# Patient Record
Sex: FEMALE | Race: WHITE | HISPANIC OR LATINO | URBAN - METROPOLITAN AREA
[De-identification: names, ages, dates, MRNs, and addresses within clinical notes are randomized per-mention and may not be internally consistent; named-entity substitution may affect disease eponyms.]

---

## 2024-08-13 ENCOUNTER — TELEPHONE ENCOUNTER (OUTPATIENT)
Dept: URBAN - METROPOLITAN AREA CLINIC 54 | Facility: CLINIC | Age: 39
End: 2024-08-13

## 2024-08-16 ENCOUNTER — DASHBOARD ENCOUNTERS (OUTPATIENT)
Age: 39
End: 2024-08-16

## 2024-08-16 ENCOUNTER — OFFICE VISIT (OUTPATIENT)
Dept: URBAN - METROPOLITAN AREA CLINIC 54 | Facility: CLINIC | Age: 39
End: 2024-08-16

## 2024-08-16 ENCOUNTER — LAB OUTSIDE AN ENCOUNTER (OUTPATIENT)
Dept: URBAN - METROPOLITAN AREA CLINIC 54 | Facility: CLINIC | Age: 39
End: 2024-08-16

## 2024-08-16 VITALS
HEIGHT: 62 IN | TEMPERATURE: 97.3 F | DIASTOLIC BLOOD PRESSURE: 74 MMHG | WEIGHT: 269.1 LBS | SYSTOLIC BLOOD PRESSURE: 104 MMHG | HEART RATE: 96 BPM | BODY MASS INDEX: 49.52 KG/M2

## 2024-08-16 PROBLEM — 235595009: Status: ACTIVE | Noted: 2024-08-16

## 2024-08-16 PROBLEM — 40739000: Status: ACTIVE | Noted: 2024-08-16

## 2024-08-16 RX ORDER — SUCRALFATE 1 G/1
1 TABLET ON AN EMPTY STOMACH TABLET ORAL
Qty: 120 TABLET | Refills: 0
End: 2024-09-15

## 2024-08-16 RX ORDER — PANTOPRAZOLE SODIUM 40 MG/1
1 TABLET TABLET, DELAYED RELEASE ORAL ONCE A DAY
Status: ACTIVE | COMMUNITY

## 2024-08-16 RX ORDER — PANTOPRAZOLE SODIUM 40 MG/1
1 TABLET TABLET, DELAYED RELEASE ORAL ONCE A DAY
Qty: 90 TABLET | Refills: 1

## 2024-08-16 RX ORDER — HYOSCYAMINE SULFATE 0.12 MG/1
1 TABLET AS NEEDED TABLET ORAL THREE TIMES A DAY
Qty: 90 TABLET | Refills: 3 | OUTPATIENT
Start: 2024-08-16 | End: 2024-12-13

## 2024-08-16 RX ORDER — SUCRALFATE 1 G/1
1 TABLET ON AN EMPTY STOMACH TABLET ORAL TWICE A DAY
Status: ACTIVE | COMMUNITY

## 2024-08-16 NOTE — HPI-TODAY'S VISIT:
Ann-Marie Mando is a  38 year year old female pt with PMH s/p CCY who presents for RUQ pain with radiation. NSAID regularlly and discontinued

## 2024-08-22 ENCOUNTER — TELEPHONE ENCOUNTER (OUTPATIENT)
Dept: URBAN - METROPOLITAN AREA CLINIC 54 | Facility: CLINIC | Age: 39
End: 2024-08-22

## 2024-09-10 ENCOUNTER — TELEPHONE ENCOUNTER (OUTPATIENT)
Dept: URBAN - METROPOLITAN AREA CLINIC 54 | Facility: CLINIC | Age: 39
End: 2024-09-10

## 2024-10-18 ENCOUNTER — OFFICE VISIT (OUTPATIENT)
Dept: URBAN - METROPOLITAN AREA CLINIC 54 | Facility: CLINIC | Age: 39
End: 2024-10-18

## 2024-10-23 ENCOUNTER — OFFICE VISIT (OUTPATIENT)
Dept: URBAN - METROPOLITAN AREA CLINIC 54 | Facility: CLINIC | Age: 39
End: 2024-10-23

## 2024-10-23 RX ORDER — OMEPRAZOLE 20 MG/1
1 CAPSULE 1/2 TO 1 HOUR BEFORE MORNING MEAL CAPSULE, DELAYED RELEASE ORAL ONCE A DAY
Status: ACTIVE | COMMUNITY

## 2024-10-23 RX ORDER — DICYCLOMINE HYDROCHLORIDE 20 MG/1
1 TABLET TABLET ORAL THREE TIMES A DAY
Status: ACTIVE | COMMUNITY

## 2024-10-23 RX ORDER — ONDANSETRON HYDROCHLORIDE 4 MG/1
1 TABLET TABLET, FILM COATED ORAL ONCE A DAY
Status: ACTIVE | COMMUNITY

## 2024-10-23 RX ORDER — SUCRALFATE 1 G/1
1 TABLET ON AN EMPTY STOMACH TABLET ORAL TWICE A DAY
Status: ACTIVE | COMMUNITY

## 2024-10-23 RX ORDER — PANTOPRAZOLE SODIUM 40 MG/1
1 TABLET TABLET, DELAYED RELEASE ORAL ONCE A DAY
Qty: 90 TABLET | Refills: 1 | Status: ACTIVE | COMMUNITY

## 2024-10-23 RX ORDER — METHOCARBAMOL 500 MG/1
1.5 TABLETS TABLET ORAL
Status: ACTIVE | COMMUNITY

## 2024-10-23 RX ORDER — RIFABUTIN 150 MG/1
2 CAPSULE WITH FOOD CAPSULE ORAL ONCE A DAY
Qty: 28 CAPSULE | Refills: 0 | Status: ACTIVE | COMMUNITY
Start: 2024-09-03

## 2024-10-23 RX ORDER — HYOSCYAMINE SULFATE 0.12 MG/1
1 TABLET AS NEEDED TABLET ORAL THREE TIMES A DAY
Qty: 90 TABLET | Refills: 3 | Status: ACTIVE | COMMUNITY
Start: 2024-08-16 | End: 2024-12-13

## 2024-11-04 ENCOUNTER — OFFICE VISIT (OUTPATIENT)
Dept: URBAN - METROPOLITAN AREA CLINIC 54 | Facility: CLINIC | Age: 39
End: 2024-11-04
Payer: COMMERCIAL

## 2024-11-04 VITALS
DIASTOLIC BLOOD PRESSURE: 79 MMHG | WEIGHT: 263 LBS | HEIGHT: 62 IN | HEART RATE: 66 BPM | SYSTOLIC BLOOD PRESSURE: 122 MMHG | TEMPERATURE: 97.4 F | BODY MASS INDEX: 48.4 KG/M2

## 2024-11-04 DIAGNOSIS — K21.9 GASTROESOPHAGEAL REFLUX DISEASE, UNSPECIFIED WHETHER ESOPHAGITIS PRESENT: ICD-10-CM

## 2024-11-04 DIAGNOSIS — Z86.19 HISTORY OF HELICOBACTER PYLORI INFECTION: ICD-10-CM

## 2024-11-04 DIAGNOSIS — R13.10 DYSPHAGIA, UNSPECIFIED TYPE: ICD-10-CM

## 2024-11-04 DIAGNOSIS — K44.9 HIATAL HERNIA: ICD-10-CM

## 2024-11-04 DIAGNOSIS — R10.10 UPPER ABDOMINAL PAIN: ICD-10-CM

## 2024-11-04 PROCEDURE — 99213 OFFICE O/P EST LOW 20 MIN: CPT | Performed by: PHYSICIAN ASSISTANT

## 2024-11-04 NOTE — HPI-TODAY'S VISIT:
Ann-Marie Gilmore is a  38 year year old female pt with PMH s/p CCY who was self referred to clinic for RUQ pain with radiation. NSAID regularlly and discontinued. She started PPI approx 2-3 days ago with minimal improvement with pain or heartburn. Pt also notes dysphagia to solids.  Patient was evaluated by [facility] inpatient at [facility] in 5/2023 for similar complaints.  EGD was performed by Dr. Evans at that time that was normal.  Gastric biopsies were positive for H. pylori.  Patient states she completed this treatment and ?confirmed eradication.  11/4/24: Patient presents for routine follow-up.  Barium swallow with tablet showed small sliding hiatal hernia with mild GERD.  No signs of obstruction or filling defect to suggest mass. After last OV, pt called back stating she was mistaken and was never treated for previous H pylori infection. Now s/p tx. Pt reports improvement in heartburn when on PPI and improvement with diet and lifestyle changes. She has been off of PPI and abx 1+ months in preparation for H. pylori breath test.

## 2024-11-06 LAB — H PYLORI BREATH TEST: NOT DETECTED

## 2024-12-03 ENCOUNTER — LAB OUTSIDE AN ENCOUNTER (OUTPATIENT)
Dept: URBAN - METROPOLITAN AREA CLINIC 54 | Facility: CLINIC | Age: 39
End: 2024-12-03

## 2024-12-03 ENCOUNTER — OFFICE VISIT (OUTPATIENT)
Dept: URBAN - METROPOLITAN AREA CLINIC 54 | Facility: CLINIC | Age: 39
End: 2024-12-03
Payer: COMMERCIAL

## 2024-12-03 VITALS
HEART RATE: 68 BPM | HEIGHT: 62 IN | WEIGHT: 268.8 LBS | BODY MASS INDEX: 49.47 KG/M2 | SYSTOLIC BLOOD PRESSURE: 104 MMHG | TEMPERATURE: 97.7 F | DIASTOLIC BLOOD PRESSURE: 65 MMHG

## 2024-12-03 DIAGNOSIS — K63.9 COLONIC THICKENING: ICD-10-CM

## 2024-12-03 DIAGNOSIS — K44.9 HIATAL HERNIA: ICD-10-CM

## 2024-12-03 DIAGNOSIS — K21.9 GASTROESOPHAGEAL REFLUX DISEASE, UNSPECIFIED WHETHER ESOPHAGITIS PRESENT: ICD-10-CM

## 2024-12-03 DIAGNOSIS — Z86.19 HISTORY OF HELICOBACTER PYLORI INFECTION: ICD-10-CM

## 2024-12-03 DIAGNOSIS — K76.9 LIVER LESION: ICD-10-CM

## 2024-12-03 DIAGNOSIS — R13.10 DYSPHAGIA, UNSPECIFIED TYPE: ICD-10-CM

## 2024-12-03 DIAGNOSIS — R10.10 UPPER ABDOMINAL PAIN: ICD-10-CM

## 2024-12-03 PROCEDURE — 99214 OFFICE O/P EST MOD 30 MIN: CPT | Performed by: PHYSICIAN ASSISTANT

## 2024-12-03 RX ORDER — PANTOPRAZOLE SODIUM 40 MG/1
1 TABLET TABLET, DELAYED RELEASE ORAL ONCE A DAY
Qty: 90 | Refills: 3

## 2024-12-03 NOTE — PHYSICAL EXAM CONSTITUTIONAL:
well developed, well nourished , nontoxic, in no acute distress , ambulating without difficulty , normal communication ability

## 2024-12-03 NOTE — HPI-TODAY'S VISIT:
Ann-Marie Gilmore is a  38 year year old female pt with PMH s/p CCY who was self referred to clinic for RUQ pain with radiation. NSAID regularlly and discontinued. She started PPI approx 2-3 days ago with minimal improvement with pain or heartburn. Pt also notes dysphagia to solids.  Patient was evaluated by [facility] inpatient at [facility] in 5/2023 for similar complaints.  EGD was performed by Dr. Evans at that time that was normal.  Gastric biopsies were positive for H. pylori.  Patient states she completed this treatment and ?confirmed eradication.  11/4/24: Patient presents for routine follow-up.  Barium swallow with tablet showed small sliding hiatal hernia with mild GERD.  No signs of obstruction or filling defect to suggest mass. After last OV, pt called back stating she was mistaken and was never treated for previous H pylori infection. Now s/p tx. Pt reports improvement in heartburn when on PPI and improvement with diet and lifestyle changes. She has been off of PPI and abx 1+ months in preparation for H. pylori breath test.  12/3/24: Pt presents for hospital follow up with abnormal imaging. Pt Was seen in the ED on 12/2/2024 for right flank pain.  CT stone protocol obtained that showed: 1.  Left hepatic hypodensity suspicious for abscess or malignancy.  Recommended liver protocol CT or MRI for better definitive characterization. 2.  Subtle inflammatory change along the ileocecal junction.  Consider follow-up with colonoscopies close possibility of underlying malignancy. 3.  No obstructive stones.  Labs in the ED were unremarkable except minimally elevated glucose.  Normal LFTs and WBCs.  Patient clinically appears well.  No previous colonoscopy. CT A/P with contrast in April 2023 showed fatty liver with 2.3 cm left hepatic lobe cyst.

## 2025-01-07 ENCOUNTER — TELEPHONE ENCOUNTER (OUTPATIENT)
Dept: URBAN - METROPOLITAN AREA CLINIC 54 | Facility: CLINIC | Age: 40
End: 2025-01-07

## 2025-01-29 ENCOUNTER — TELEPHONE ENCOUNTER (OUTPATIENT)
Dept: URBAN - METROPOLITAN AREA CLINIC 54 | Facility: CLINIC | Age: 40
End: 2025-01-29

## 2025-01-31 ENCOUNTER — OFFICE VISIT (OUTPATIENT)
Dept: URBAN - METROPOLITAN AREA SURGERY CENTER 14 | Facility: SURGERY CENTER | Age: 40
End: 2025-01-31

## 2025-02-06 ENCOUNTER — OFFICE VISIT (OUTPATIENT)
Dept: URBAN - METROPOLITAN AREA SURGERY CENTER 14 | Facility: SURGERY CENTER | Age: 40
End: 2025-02-06

## 2025-02-17 ENCOUNTER — OFFICE VISIT (OUTPATIENT)
Dept: URBAN - METROPOLITAN AREA CLINIC 54 | Facility: CLINIC | Age: 40
End: 2025-02-17

## 2025-02-25 ENCOUNTER — OFFICE VISIT (OUTPATIENT)
Dept: URBAN - METROPOLITAN AREA SURGERY CENTER 14 | Facility: SURGERY CENTER | Age: 40
End: 2025-02-25

## 2025-02-25 ENCOUNTER — OFFICE VISIT (OUTPATIENT)
Dept: URBAN - METROPOLITAN AREA SURGERY CENTER 16 | Facility: SURGERY CENTER | Age: 40
End: 2025-02-25

## 2025-02-28 ENCOUNTER — OFFICE VISIT (OUTPATIENT)
Dept: URBAN - METROPOLITAN AREA CLINIC 54 | Facility: CLINIC | Age: 40
End: 2025-02-28

## 2025-03-18 ENCOUNTER — OFFICE VISIT (OUTPATIENT)
Dept: URBAN - METROPOLITAN AREA CLINIC 54 | Facility: CLINIC | Age: 40
End: 2025-03-18